# Patient Record
Sex: FEMALE | Race: WHITE | Employment: OTHER | URBAN - METROPOLITAN AREA
[De-identification: names, ages, dates, MRNs, and addresses within clinical notes are randomized per-mention and may not be internally consistent; named-entity substitution may affect disease eponyms.]

---

## 2022-01-19 ENCOUNTER — APPOINTMENT (OUTPATIENT)
Dept: GENERAL RADIOLOGY | Age: 32
End: 2022-01-19
Attending: EMERGENCY MEDICINE
Payer: COMMERCIAL

## 2022-01-19 ENCOUNTER — HOSPITAL ENCOUNTER (OUTPATIENT)
Age: 32
Discharge: HOME | End: 2022-01-19
Attending: EMERGENCY MEDICINE
Payer: COMMERCIAL

## 2022-01-19 VITALS
OXYGEN SATURATION: 99 % | HEART RATE: 110 BPM | WEIGHT: 126 LBS | RESPIRATION RATE: 18 BRPM | DIASTOLIC BLOOD PRESSURE: 82 MMHG | SYSTOLIC BLOOD PRESSURE: 122 MMHG | TEMPERATURE: 99 F

## 2022-01-19 DIAGNOSIS — M94.0 COSTOCHONDRITIS: Primary | ICD-10-CM

## 2022-01-19 DIAGNOSIS — M62.830 BACK SPASM: ICD-10-CM

## 2022-01-19 DIAGNOSIS — R07.89 CHEST PAIN, MUSCULOSKELETAL: ICD-10-CM

## 2022-01-19 LAB
ATRIAL RATE: 80 BPM
P AXIS: 64 DEGREES
P-R INTERVAL: 140 MS
Q-T INTERVAL: 388 MS
QRS DURATION: 86 MS
QTC CALCULATION (BEZET): 447 MS
R AXIS: 76 DEGREES
T AXIS: 63 DEGREES
VENTRICULAR RATE: 80 BPM

## 2022-01-19 PROCEDURE — 71046 X-RAY EXAM CHEST 2 VIEWS: CPT | Performed by: EMERGENCY MEDICINE

## 2022-01-19 PROCEDURE — 93005 ELECTROCARDIOGRAM TRACING: CPT

## 2022-01-19 PROCEDURE — 99203 OFFICE O/P NEW LOW 30 MIN: CPT

## 2022-01-19 PROCEDURE — 99204 OFFICE O/P NEW MOD 45 MIN: CPT

## 2022-01-19 PROCEDURE — 93010 ELECTROCARDIOGRAM REPORT: CPT

## 2022-01-19 RX ORDER — LIDOCAINE 4 G/G
1 PATCH TOPICAL EVERY 24 HOURS
Qty: 15 PATCH | Refills: 0 | Status: SHIPPED | OUTPATIENT
Start: 2022-01-19

## 2022-01-19 RX ORDER — DIAZEPAM 5 MG/1
TABLET ORAL NIGHTLY PRN
Qty: 10 TABLET | Refills: 0 | Status: SHIPPED | OUTPATIENT
Start: 2022-01-19 | End: 2022-01-26

## 2022-01-19 NOTE — ED PROVIDER NOTES
Patient Seen in: [facility]      History   Patient presents with:  Difficulty Breathing    Stated Complaint: chest pain / radiates to arm    Subjective:   HPI    Patient says that for the past 2 years she has had pain in the left upper ba Physical Exam  General: Well-developed, well-nourished, comfortable, no acute distress  Head and neck: Normocephalic, atraumatic  Cardiovascular: Regular rate and rhythm, normal S1 and S2, no obvious murmurs, rubs, or gallops   Lungs: Clear to ausc MD  [address] [facility] [id number] [phone]      As needed          Medications Prescribed:  Current Discharge Medication List    START taking these medications    diazePAM 5 MG Oral Tab  Take 0.5-1 tablets (2.5-5 mg total) by mouth n

## 2022-01-19 NOTE — ED QUICK NOTES
RN discussed dc instructions, medications as rx, no lifting over 10 lbs with left arm and follow up with orhto for left arm with patient. Pt verbalized understanding of information.

## 2022-01-19 NOTE — ED INITIAL ASSESSMENT (HPI)
Pt aox4. Pt states woke up yesterday at 3am with OCTAVIA. Pt c/o chest pain with octavia. Pt denies fever/cough. Pt c/o increased OCTAVIA with exertion. Pt c/o left arm pain that is chronic x 2 yrs Pt has been evaluated by PCP for left arm pain and has had imaging.  Pt

## 2023-06-01 ENCOUNTER — APPOINTMENT (OUTPATIENT)
Dept: URBAN - METROPOLITAN AREA CLINIC 247 | Age: 33
Setting detail: DERMATOLOGY
End: 2023-06-01

## 2023-06-01 DIAGNOSIS — D22 MELANOCYTIC NEVI: ICD-10-CM

## 2023-06-01 DIAGNOSIS — L82.1 OTHER SEBORRHEIC KERATOSIS: ICD-10-CM

## 2023-06-01 DIAGNOSIS — L82.0 INFLAMED SEBORRHEIC KERATOSIS: ICD-10-CM

## 2023-06-01 PROBLEM — D22.5 MELANOCYTIC NEVI OF TRUNK: Status: ACTIVE | Noted: 2023-06-01

## 2023-06-01 PROCEDURE — OTHER LIQUID NITROGEN: OTHER

## 2023-06-01 PROCEDURE — 99202 OFFICE O/P NEW SF 15 MIN: CPT | Mod: 25

## 2023-06-01 PROCEDURE — OTHER DEFER: OTHER

## 2023-06-01 PROCEDURE — OTHER COUNSELING: OTHER

## 2023-06-01 PROCEDURE — 17110 DESTRUCT B9 LESION 1-14: CPT

## 2023-06-01 ASSESSMENT — LOCATION DETAILED DESCRIPTION DERM
LOCATION DETAILED: LEFT LATERAL ABDOMEN
LOCATION DETAILED: RIGHT RIB CAGE
LOCATION DETAILED: RIGHT LATERAL ABDOMEN
LOCATION DETAILED: LEFT MEDIAL BREAST 8-9:00 REGION
LOCATION DETAILED: LEFT INFRAMAMMARY CREASE (INNER QUADRANT)
LOCATION DETAILED: SUBXIPHOID
LOCATION DETAILED: EPIGASTRIC SKIN
LOCATION DETAILED: LEFT RIB CAGE

## 2023-06-01 ASSESSMENT — LOCATION SIMPLE DESCRIPTION DERM
LOCATION SIMPLE: ABDOMEN
LOCATION SIMPLE: LEFT BREAST

## 2023-06-01 ASSESSMENT — LOCATION ZONE DERM: LOCATION ZONE: TRUNK

## 2023-06-01 NOTE — PROCEDURE: LIQUID NITROGEN
Show Aperture Variable?: Yes
Add 52 Modifier (Optional): no
Consent: The patient's consent was obtained including but not limited to risks of crusting, scabbing, blistering, scarring, darker or lighter pigmentary change, recurrence, incomplete removal and infection.
Spray Paint Text: The liquid nitrogen was applied to the skin utilizing a spray paint frosting technique.
Medical Necessity Clause: This procedure was medically necessary because the lesions that were treated were:
Detail Level: Simple
Medical Necessity Information: It is in your best interest to select a reason for this procedure from the list below. All of these items fulfill various CMS LCD requirements except the new and changing color options.
Post-Care Instructions: I reviewed with the patient in detail post-care instructions. Patient is to wear sunprotection, and avoid picking at any of the treated lesions. Pt may apply Vaseline to crusted or scabbing areas.

## 2023-06-01 NOTE — PROCEDURE: DEFER
Detail Level: Simple
Introduction Text (Please End With A Colon): .
X Size Of Lesion In Cm (Optional): 0
Introduction Text (Please End With A Colon): .